# Patient Record
Sex: MALE | Race: WHITE | NOT HISPANIC OR LATINO | Employment: FULL TIME | URBAN - METROPOLITAN AREA
[De-identification: names, ages, dates, MRNs, and addresses within clinical notes are randomized per-mention and may not be internally consistent; named-entity substitution may affect disease eponyms.]

---

## 2023-12-27 ENCOUNTER — HOSPITAL ENCOUNTER (OUTPATIENT)
Facility: HOSPITAL | Age: 23
Setting detail: HOSPITAL OUTPATIENT SURGERY
Discharge: HOME | End: 2023-12-27
Attending: ORTHOPAEDIC SURGERY | Admitting: ORTHOPAEDIC SURGERY
Payer: COMMERCIAL

## 2023-12-27 ENCOUNTER — APPOINTMENT (OUTPATIENT)
Dept: GENERAL RADIOLOGY | Facility: HOSPITAL | Age: 23
End: 2023-12-27
Payer: COMMERCIAL

## 2023-12-27 ENCOUNTER — ANESTHESIA EVENT CONVERTED (OUTPATIENT)
Dept: ANESTHESIOLOGY | Facility: HOSPITAL | Age: 23
End: 2023-12-27
Payer: COMMERCIAL

## 2023-12-27 ENCOUNTER — ANESTHESIA EVENT (OUTPATIENT)
Dept: PERIOP | Facility: HOSPITAL | Age: 23
End: 2023-12-27
Payer: COMMERCIAL

## 2023-12-27 ENCOUNTER — ANESTHESIA (OUTPATIENT)
Dept: PERIOP | Facility: HOSPITAL | Age: 23
End: 2023-12-27
Payer: COMMERCIAL

## 2023-12-27 VITALS
WEIGHT: 248 LBS | OXYGEN SATURATION: 96 % | BODY MASS INDEX: 31.83 KG/M2 | HEART RATE: 105 BPM | TEMPERATURE: 98.9 F | DIASTOLIC BLOOD PRESSURE: 82 MMHG | SYSTOLIC BLOOD PRESSURE: 137 MMHG | RESPIRATION RATE: 18 BRPM | HEIGHT: 74 IN

## 2023-12-27 PROCEDURE — C1713 ANCHOR/SCREW BN/BN,TIS/BN: HCPCS | Performed by: ORTHOPAEDIC SURGERY

## 2023-12-27 PROCEDURE — 25010000002 SUGAMMADEX 200 MG/2ML SOLUTION: Performed by: NURSE ANESTHETIST, CERTIFIED REGISTERED

## 2023-12-27 PROCEDURE — 76000 FLUOROSCOPY <1 HR PHYS/QHP: CPT

## 2023-12-27 PROCEDURE — 25810000003 LACTATED RINGERS PER 1000 ML: Performed by: ANESTHESIOLOGY

## 2023-12-27 PROCEDURE — 25010000002 ONDANSETRON PER 1 MG: Performed by: NURSE ANESTHETIST, CERTIFIED REGISTERED

## 2023-12-27 PROCEDURE — 25010000002 BUPIVACAINE (PF) 0.25 % SOLUTION

## 2023-12-27 PROCEDURE — 25010000002 PROPOFOL 10 MG/ML EMULSION: Performed by: NURSE ANESTHETIST, CERTIFIED REGISTERED

## 2023-12-27 PROCEDURE — 27416 OSTEOCHONDRAL KNEE AUTOGRAFT: CPT

## 2023-12-27 PROCEDURE — C1769 GUIDE WIRE: HCPCS | Performed by: ORTHOPAEDIC SURGERY

## 2023-12-27 PROCEDURE — 25010000002 DEXAMETHASONE SODIUM PHOSPHATE 10 MG/ML SOLUTION

## 2023-12-27 PROCEDURE — 25010000002 FENTANYL CITRATE (PF) 50 MCG/ML SOLUTION

## 2023-12-27 PROCEDURE — 25010000002 VANCOMYCIN 1 G RECONSTITUTED SOLUTION 1 EACH VIAL: Performed by: ORTHOPAEDIC SURGERY

## 2023-12-27 PROCEDURE — 97116 GAIT TRAINING THERAPY: CPT

## 2023-12-27 PROCEDURE — 25010000002 ROPIVACAINE HCL-NACL 0.2-0.9 % SOLUTION

## 2023-12-27 PROCEDURE — 27418 REPAIR DEGENERATED KNEECAP: CPT

## 2023-12-27 PROCEDURE — 27427 RECONSTRUCTION KNEE: CPT

## 2023-12-27 PROCEDURE — 25010000002 ESMOLOL 100 MG/10ML SOLUTION: Performed by: NURSE ANESTHETIST, CERTIFIED REGISTERED

## 2023-12-27 PROCEDURE — 97161 PT EVAL LOW COMPLEX 20 MIN: CPT

## 2023-12-27 PROCEDURE — 25010000002 VANCOMYCIN 1 G RECONSTITUTED SOLUTION: Performed by: ORTHOPAEDIC SURGERY

## 2023-12-27 PROCEDURE — L1833 KO ADJ JNT POS R SUP PRE OTS: HCPCS | Performed by: ORTHOPAEDIC SURGERY

## 2023-12-27 PROCEDURE — 25010000002 DEXAMETHASONE PER 1 MG: Performed by: NURSE ANESTHETIST, CERTIFIED REGISTERED

## 2023-12-27 PROCEDURE — 25010000002 CEFAZOLIN PER 500 MG: Performed by: ORTHOPAEDIC SURGERY

## 2023-12-27 DEVICE — DEV CONTRL TISS STRATAFIX SPIRAL MNCRYL PLS PS 3/0 30CM UD: Type: IMPLANTABLE DEVICE | Site: KNEE | Status: FUNCTIONAL

## 2023-12-27 DEVICE — IMPLANTABLE DEVICE: Type: IMPLANTABLE DEVICE | Site: KNEE | Status: FUNCTIONAL

## 2023-12-27 DEVICE — DEV CONTRL TISS STRATAFIX SPIRAL PDO BIDIR 1 36X36CM: Type: IMPLANTABLE DEVICE | Site: KNEE | Status: FUNCTIONAL

## 2023-12-27 DEVICE — SYS IMP FHL W/BUTN B6.25MM: Type: IMPLANTABLE DEVICE | Site: KNEE | Status: FUNCTIONAL

## 2023-12-27 DEVICE — SUT/ANCH FIBERTAK GEN2 KNOTLSS W/NMBR2/SUT 1.8MM: Type: IMPLANTABLE DEVICE | Site: KNEE | Status: FUNCTIONAL

## 2023-12-27 RX ORDER — SODIUM CHLORIDE 0.9 % (FLUSH) 0.9 %
10 SYRINGE (ML) INJECTION AS NEEDED
Status: DISCONTINUED | OUTPATIENT
Start: 2023-12-27 | End: 2023-12-27 | Stop reason: HOSPADM

## 2023-12-27 RX ORDER — FAMOTIDINE 20 MG/1
20 TABLET, FILM COATED ORAL
Status: COMPLETED | OUTPATIENT
Start: 2023-12-27 | End: 2023-12-27

## 2023-12-27 RX ORDER — BUPIVACAINE HYDROCHLORIDE 2.5 MG/ML
INJECTION, SOLUTION EPIDURAL; INFILTRATION; INTRACAUDAL
Status: COMPLETED | OUTPATIENT
Start: 2023-12-27 | End: 2023-12-27

## 2023-12-27 RX ORDER — ONDANSETRON 2 MG/ML
INJECTION INTRAMUSCULAR; INTRAVENOUS AS NEEDED
Status: DISCONTINUED | OUTPATIENT
Start: 2023-12-27 | End: 2023-12-27 | Stop reason: SURG

## 2023-12-27 RX ORDER — VANCOMYCIN HYDROCHLORIDE 1 G/20ML
INJECTION, POWDER, LYOPHILIZED, FOR SOLUTION INTRAVENOUS AS NEEDED
Status: DISCONTINUED | OUTPATIENT
Start: 2023-12-27 | End: 2023-12-27 | Stop reason: HOSPADM

## 2023-12-27 RX ORDER — SODIUM CHLORIDE 0.9 % (FLUSH) 0.9 %
10 SYRINGE (ML) INJECTION EVERY 12 HOURS SCHEDULED
Status: DISCONTINUED | OUTPATIENT
Start: 2023-12-27 | End: 2023-12-27 | Stop reason: HOSPADM

## 2023-12-27 RX ORDER — MIDAZOLAM HYDROCHLORIDE 1 MG/ML
1 INJECTION INTRAMUSCULAR; INTRAVENOUS
Status: DISCONTINUED | OUTPATIENT
Start: 2023-12-27 | End: 2023-12-27 | Stop reason: HOSPADM

## 2023-12-27 RX ORDER — DEXAMETHASONE SODIUM PHOSPHATE 10 MG/ML
INJECTION, SOLUTION INTRAMUSCULAR; INTRAVENOUS
Status: COMPLETED | OUTPATIENT
Start: 2023-12-27 | End: 2023-12-27

## 2023-12-27 RX ORDER — HYDROMORPHONE HYDROCHLORIDE 1 MG/ML
0.5 INJECTION, SOLUTION INTRAMUSCULAR; INTRAVENOUS; SUBCUTANEOUS
Status: DISCONTINUED | OUTPATIENT
Start: 2023-12-27 | End: 2023-12-27 | Stop reason: HOSPADM

## 2023-12-27 RX ORDER — PROPOFOL 10 MG/ML
VIAL (ML) INTRAVENOUS AS NEEDED
Status: DISCONTINUED | OUTPATIENT
Start: 2023-12-27 | End: 2023-12-27 | Stop reason: SURG

## 2023-12-27 RX ORDER — ESMOLOL HYDROCHLORIDE 10 MG/ML
INJECTION INTRAVENOUS AS NEEDED
Status: DISCONTINUED | OUTPATIENT
Start: 2023-12-27 | End: 2023-12-27 | Stop reason: SURG

## 2023-12-27 RX ORDER — LIDOCAINE HYDROCHLORIDE 10 MG/ML
0.5 INJECTION, SOLUTION EPIDURAL; INFILTRATION; INTRACAUDAL; PERINEURAL ONCE AS NEEDED
Status: COMPLETED | OUTPATIENT
Start: 2023-12-27 | End: 2023-12-27

## 2023-12-27 RX ORDER — IPRATROPIUM BROMIDE AND ALBUTEROL SULFATE 2.5; .5 MG/3ML; MG/3ML
3 SOLUTION RESPIRATORY (INHALATION) ONCE AS NEEDED
Status: DISCONTINUED | OUTPATIENT
Start: 2023-12-27 | End: 2023-12-27 | Stop reason: HOSPADM

## 2023-12-27 RX ORDER — ROCURONIUM BROMIDE 10 MG/ML
INJECTION, SOLUTION INTRAVENOUS AS NEEDED
Status: DISCONTINUED | OUTPATIENT
Start: 2023-12-27 | End: 2023-12-27 | Stop reason: SURG

## 2023-12-27 RX ORDER — FENTANYL CITRATE 50 UG/ML
INJECTION, SOLUTION INTRAMUSCULAR; INTRAVENOUS
Status: COMPLETED | OUTPATIENT
Start: 2023-12-27 | End: 2023-12-27

## 2023-12-27 RX ORDER — DEXAMETHASONE SODIUM PHOSPHATE 4 MG/ML
INJECTION, SOLUTION INTRA-ARTICULAR; INTRALESIONAL; INTRAMUSCULAR; INTRAVENOUS; SOFT TISSUE AS NEEDED
Status: DISCONTINUED | OUTPATIENT
Start: 2023-12-27 | End: 2023-12-27 | Stop reason: SURG

## 2023-12-27 RX ORDER — ROPIVACAINE HYDROCHLORIDE 2 MG/ML
INJECTION, SOLUTION EPIDURAL; INFILTRATION; PERINEURAL CONTINUOUS
Status: DISCONTINUED | OUTPATIENT
Start: 2023-12-27 | End: 2023-12-27 | Stop reason: HOSPADM

## 2023-12-27 RX ORDER — MAGNESIUM HYDROXIDE 1200 MG/15ML
LIQUID ORAL AS NEEDED
Status: DISCONTINUED | OUTPATIENT
Start: 2023-12-27 | End: 2023-12-27 | Stop reason: HOSPADM

## 2023-12-27 RX ORDER — SODIUM CHLORIDE 9 MG/ML
40 INJECTION, SOLUTION INTRAVENOUS AS NEEDED
Status: DISCONTINUED | OUTPATIENT
Start: 2023-12-27 | End: 2023-12-27 | Stop reason: HOSPADM

## 2023-12-27 RX ORDER — SODIUM CHLORIDE, SODIUM LACTATE, POTASSIUM CHLORIDE, CALCIUM CHLORIDE 600; 310; 30; 20 MG/100ML; MG/100ML; MG/100ML; MG/100ML
9 INJECTION, SOLUTION INTRAVENOUS CONTINUOUS PRN
Status: DISCONTINUED | OUTPATIENT
Start: 2023-12-27 | End: 2023-12-27 | Stop reason: HOSPADM

## 2023-12-27 RX ORDER — FENTANYL CITRATE 50 UG/ML
50 INJECTION, SOLUTION INTRAMUSCULAR; INTRAVENOUS
Status: DISCONTINUED | OUTPATIENT
Start: 2023-12-27 | End: 2023-12-27 | Stop reason: HOSPADM

## 2023-12-27 RX ORDER — LIDOCAINE HYDROCHLORIDE 10 MG/ML
INJECTION, SOLUTION EPIDURAL; INFILTRATION; INTRACAUDAL; PERINEURAL AS NEEDED
Status: DISCONTINUED | OUTPATIENT
Start: 2023-12-27 | End: 2023-12-27 | Stop reason: SURG

## 2023-12-27 RX ORDER — FENTANYL CITRATE 50 UG/ML
INJECTION, SOLUTION INTRAMUSCULAR; INTRAVENOUS AS NEEDED
Status: DISCONTINUED | OUTPATIENT
Start: 2023-12-27 | End: 2023-12-27 | Stop reason: SURG

## 2023-12-27 RX ORDER — ONDANSETRON 2 MG/ML
4 INJECTION INTRAMUSCULAR; INTRAVENOUS ONCE AS NEEDED
Status: DISCONTINUED | OUTPATIENT
Start: 2023-12-27 | End: 2023-12-27 | Stop reason: HOSPADM

## 2023-12-27 RX ADMIN — SUGAMMADEX 200 MG: 100 INJECTION, SOLUTION INTRAVENOUS at 15:08

## 2023-12-27 RX ADMIN — PROPOFOL 200 MG: 10 INJECTION, EMULSION INTRAVENOUS at 12:29

## 2023-12-27 RX ADMIN — SODIUM CHLORIDE, POTASSIUM CHLORIDE, SODIUM LACTATE AND CALCIUM CHLORIDE: 600; 310; 30; 20 INJECTION, SOLUTION INTRAVENOUS at 12:54

## 2023-12-27 RX ADMIN — DEXAMETHASONE SODIUM PHOSPHATE 2 MG: 10 INJECTION, SOLUTION INTRAMUSCULAR; INTRAVENOUS at 11:13

## 2023-12-27 RX ADMIN — ROCURONIUM BROMIDE 50 MG: 50 INJECTION INTRAVENOUS at 13:04

## 2023-12-27 RX ADMIN — FENTANYL CITRATE 50 MCG: 50 INJECTION, SOLUTION INTRAMUSCULAR; INTRAVENOUS at 12:44

## 2023-12-27 RX ADMIN — FENTANYL CITRATE 100 MCG: 50 INJECTION, SOLUTION INTRAMUSCULAR; INTRAVENOUS at 11:00

## 2023-12-27 RX ADMIN — SODIUM CHLORIDE, POTASSIUM CHLORIDE, SODIUM LACTATE AND CALCIUM CHLORIDE 9 ML/HR: 600; 310; 30; 20 INJECTION, SOLUTION INTRAVENOUS at 10:47

## 2023-12-27 RX ADMIN — PROPOFOL 200 MG: 10 INJECTION, EMULSION INTRAVENOUS at 12:28

## 2023-12-27 RX ADMIN — BUPIVACAINE HYDROCHLORIDE 20 ML: 2.5 INJECTION, SOLUTION EPIDURAL; INFILTRATION; INTRACAUDAL at 11:13

## 2023-12-27 RX ADMIN — LIDOCAINE HYDROCHLORIDE 0.5 ML: 10 INJECTION, SOLUTION EPIDURAL; INFILTRATION; INTRACAUDAL; PERINEURAL at 10:47

## 2023-12-27 RX ADMIN — FENTANYL CITRATE 50 MCG: 50 INJECTION, SOLUTION INTRAMUSCULAR; INTRAVENOUS at 12:29

## 2023-12-27 RX ADMIN — BUPIVACAINE HYDROCHLORIDE 30 ML: 2.5 INJECTION, SOLUTION EPIDURAL; INFILTRATION; INTRACAUDAL; PERINEURAL at 11:03

## 2023-12-27 RX ADMIN — Medication 1000 MG: at 16:00

## 2023-12-27 RX ADMIN — ONDANSETRON 4 MG: 2 INJECTION INTRAMUSCULAR; INTRAVENOUS at 15:06

## 2023-12-27 RX ADMIN — PROPOFOL 200 MG: 10 INJECTION, EMULSION INTRAVENOUS at 12:44

## 2023-12-27 RX ADMIN — ESMOLOL HYDROCHLORIDE 50 MG: 10 INJECTION, SOLUTION INTRAVENOUS at 15:50

## 2023-12-27 RX ADMIN — FAMOTIDINE 20 MG: 20 TABLET, FILM COATED ORAL at 10:47

## 2023-12-27 RX ADMIN — DEXAMETHASONE SODIUM PHOSPHATE 8 MG: 4 INJECTION, SOLUTION INTRAMUSCULAR; INTRAVENOUS at 12:36

## 2023-12-27 RX ADMIN — LIDOCAINE HYDROCHLORIDE 50 MG: 10 INJECTION, SOLUTION EPIDURAL; INFILTRATION; INTRACAUDAL; PERINEURAL at 12:29

## 2023-12-27 RX ADMIN — SODIUM CHLORIDE 2000 MG: 900 INJECTION INTRAVENOUS at 12:33

## 2023-12-27 NOTE — PLAN OF CARE
Goal Outcome Evaluation:  Plan of Care Reviewed With: patient, sibling        Progress: no change  Outcome Evaluation: Pt amb 40' with FWW and CGA. Pt navigated a step with CGA and FWW. VC for sequencing. Good ability to maintain PWB throughout mobility. Pt encouraged to utilize FWW for stairs for safest option when returning home today. Pt and brother verbalized understanding and stated they had a FWW at home. Recommend d/c home with 24/7 assist and OPPT when medically appropriate.      Anticipated Discharge Disposition (PT): home with outpatient therapy services, home with assist

## 2023-12-27 NOTE — ANESTHESIA POSTPROCEDURE EVALUATION
Patient: Nhan Merritt    Procedure Summary       Date: 12/27/23 Room / Location:  [facility] /  [facility]    Anesthesia Start: 1225 Anesthesia Stop: 1600    Procedures:       KNEE ARTHROSCOPY AUTOLOGOUS CHONDROCYTE IMPLANTATION, TIBIAL TURBERICAL OSTEOTOMY, MEDIAL PATELLOFEMORAL RECONSTRUCTION (Left: Knee)      KNEE ARTHROSCOPY WITH PATELLA FEMORAL LIGAMENT RECONSTRUCTION (Left: Knee) Diagnosis:     Surgeons: Delmer Maria MD Provider: Lb Neumann MD    Anesthesia Type: general ASA Status: 2            Anesthesia Type: general    Vitals  Vitals Value Taken Time   BP     Temp     Pulse 96 12/27/23 1559   Resp     SpO2 95 % 12/27/23 1559   Vitals shown include unfiled device data.        Post Anesthesia Care and Evaluation    Patient location during evaluation: PACU  Patient participation: complete - patient participated  Level of consciousness: awake and alert  Pain management: adequate    Airway patency: patent  Anesthetic complications: No anesthetic complications  PONV Status: none  Cardiovascular status: hemodynamically stable and acceptable  Respiratory status: nonlabored ventilation, acceptable and nasal cannula  Hydration status: acceptable

## 2023-12-27 NOTE — H&P
"  Pre-Op H&P  Nhan Merritt  [id number]  2000      Chief complaint: Left knee pain      Subjective:  Patient is a 23 y.o.male presents for scheduled surgery by Dr. Maria. He anticipates a KNEE ARTHROSCOPY OSTEOCHONDRAL ALLOGRAFT, TIBIAL TURBERICAL OSTEOTOMY, MEDIAL PATELLOFEMORAL RECONSTRUCTION - Left today.  The patient endorses dislocating his left knee during a basketball game in October of 2023.  He endorses pain since that time.  He denies doing anything to help alleviate his pain.  He denies the use of a carter or assistive device to ambulate.    Review of Systems:  Constitutional-- No fever, chills or sweats. No fatigue.  CV-- No chest pain, palpitation or syncope  Resp-- No SOB, cough, hemoptysis  Skin--No rashes or lesions      Allergies: No Known Allergies      Home Meds:  Medications Prior to Admission   Medication Sig Dispense Refill Last Dose    aspirin (Adult Aspirin Regimen) 81 MG EC tablet Take 1 tablet by mouth Daily. 28 tablet 0     ondansetron (ZOFRAN) 4 MG tablet Take 1 tablet by mouth Every 6 (Six) Hours As Needed for Nausea. 30 tablet 0     oxyCODONE (ROXICODONE) 5 MG immediate release tablet Take 1-2 tablet(s) by mouth Every 4-6 Hours As Needed for Post-Op Pain. 36 tablet 0          PMH: History reviewed. No pertinent past medical history.  PSH:    Past Surgical History:   Procedure Laterality Date    FINGER SURGERY Right     cyst removal       Immunization History:  Influenza: No  Pneumococcal: No  Tetanus: Yes  Covid : x2    Social History:   Tobacco:   Social History     Tobacco Use   Smoking Status Never    Passive exposure: Current   Smokeless Tobacco Never      Alcohol:     Social History     Substance and Sexual Activity   Alcohol Use Not Currently         Physical Exam:Ht 188 cm (74\")   Wt 112 kg (248 lb)   BMI 31.84 kg/m²   BP: 151/98  HR: 99  RR: 17  SPO2: 100% on RA  Temp: 98.6    General Appearance:    Alert, cooperative, no distress, appears stated age   Head:    " "Normocephalic, without obvious abnormality, atraumatic   Lungs:     Clear to auscultation bilaterally, respirations unlabored    Heart:   Regular rate and rhythm, S1 and S2 normal    Abdomen:    Soft without tenderness   Extremities:   Extremities normal, atraumatic, no cyanosis or edema   Skin:   Skin color, texture, turgor normal, no rashes or lesions   Neurologic:   Grossly intact     Results Review:     LABS:  No results found for: \"WBC\", \"HGB\", \"HCT\", \"MCV\", \"PLT\", \"NEUTROABS\", \"GLUCOSE\", \"BUN\", \"CREATININE\", \"EGFRIFNONA\", \"EGFRIFAFRI\", \"NA\", \"K\", \"CL\", \"CO2\", \"MG\", \"PHOS\", \"CALCIUM\", \"ALBUMIN\", \"AST\", \"ALT\", \"BILITOT\"    RADIOLOGY:  Imaging Results (Last 72 Hours)       ** No results found for the last 72 hours. **            I reviewed the patient's new clinical results.    Impression: Left knee chondromalacia      Plan: KNEE ARTHROSCOPY OSTEOCHONDRAL ALLOGRAFT, TIBIAL TURBERICAL OSTEOTOMY, MEDIAL PATELLOFEMORAL RECONSTRUCTION - Left       Sohan Dominguez, APRN   12/27/2023   10:27 EST  "

## 2023-12-27 NOTE — DISCHARGE INSTRUCTIONS
InfuBLOCK - Patient Information    What is a pain pump?  The InfuBLOCK pump delivers post-operative, non-narcotic, numbing medication to the nerve near the surgical site for pain relief.     Where can I find information about my pain pump?           For more information about your pain pump, scan the QR code.  For additional patient resources, visit [email or web address].                                                                                               While your physician is your primary source for information about your treatment there may be times during your treatment that you need assistance with your infusion pump.     If you need assistance take the following steps:    The Patients Know Best Nursing Hotline is Here for You 24/7.  Please call [phone] for the following concerns or complications:    Answers to questions about your infusion pump                 Tubing disconnect  Assistance with pump alarms                                                      Dislodged catheter  Excessive leakage noted from pump                                         Inadequate pain control    2.   [facility] Anesthesia Acute Pain Service: [phone] is available 24/7 for any further needs or concerns about medication or pain control.     -------------------------------------------------------------------------    Nerve Catheter Removal Instructions  When your device is empty:    Remove your catheter by pulling the dressing off slowly (like you would remove a regular bandage). The catheter should pull right out of the skin.  Check that the BLUE tip is intact.                                                                                     If the catheter is stuck, reposition your   extremity and pull slowly until removed.  *If catheter is HURTING and WON'T come out, stop and call [phone] for further assistance.    Remove medication bag from the black carrying case.  Cut the  tubing on right and left side of pump, and discard the medication bag and tubing into garbage.  Place the pump and black carrying case into the plastic bag and then place this into the return box.  Seal box with blue stickers and return to [place] postal service. THIS IS PRE-PAID POSTAGE.        -------------------------------------------------------------------------    [facility] COLD THERAPY - PATIENT INSTRUCTION SHEET    Cold Compression Therapy for your comfort and rehabilitation  Your caregivers want you to be productive in your rehab and comfortable during your stay. In keeping with those goals, you will be receiving an SMI Cold Therapy Wrap to help ease post-operative pain and swelling that might keep you from getting back on track! Your SMI Cold Therapy Wrap is effective and simple-to-use, and you will be encouraged to apply it throughout your hospital stay and at home through the duration of your recovery.    When you are ready to go home  Be sure to take your SMI Cold Therapy Wrap and both sets of Gel Bags with you for continued comfort and use throughout your rehabilitation. If you don't already have them, ask your nurse or aide to retrieve your SMI Gel Bags from the patient freezer.    Home use precautions  Always follow your medical professional's application instructions upon discharge. Your SMI Cold Therapy Wrap and Gel Bags are designed to last for months following your surgery. Never heat the Gel Bags unless specified by your healthcare provider. Supervision is advised when using this product on children or geriatric patients. To avoid danger of suffocation, please keep the outer plastic packaging away from children & pets.    Cold Therapy Instructions  Place Gel Bags in a freezer set ¾ of the way to max temperature for at least (4) hours. For best results, lay the Gel Bags flat and [id number] in the freezer. Once frozen, slide Gel Bags into the gel pouch and secure your wrap to the affected area with the  straps.  Gel wraps that have been stored in a freezer for an extended period of time may require a (10) minute period of softening up in a room temperature environment before application.  The gel pouch acts as a protective barrier. NEVER place frozen bags directly onto skin, as this may cause frostbite injury.  The [facility] Cold Therapy Wrap is designed to be able to be worm while ambulating. The compression straps can be secured well enough so that the Wrap won't fall off while moving.  Wrap Application Videos can be viewed at [email or web address].  An additional protective barrier such as clothing, a washcloth, hand-towel or pillowcase may be used during prolonged treatment applications.  The Gel-Pouch and Wrap are both Latex-Free and the Gel Bag ingredients are non toxic.    [facility] Wrap care instructions  The [facility] Cold Therapy Wrap may be hand washed and hung to dry when needed.    [facility] re-order information  Additional [facility] body specific wraps and/or Gel Bags can be re-ordered from [employer] or call [employer] ([phone])

## 2023-12-27 NOTE — ANESTHESIA PROCEDURE NOTES
Left Femoral Nerve Cath      Patient reassessed immediately prior to procedure    Patient location during procedure: pre-op  Reason for block: at surgeon's request and post-op pain management  Performed by  CRNA/CAA: Gianni Rendon CRNA  Assisted by: Layla Hernandez RN  Preanesthetic Checklist  Completed: patient identified, IV checked, site marked, risks and benefits discussed, surgical consent, monitors and equipment checked, pre-op evaluation and timeout performed  Prep:  Pt Position: supine  Sterile barriers:gloves, cap, sterile barriers, mask and washed/disinfected hands  Prep: ChloraPrep  Patient monitoring: blood pressure monitoring, continuous pulse oximetry and EKG  Procedure    Sedation: yes  Performed under: local infiltration  Guidance:ultrasound guided    ULTRASOUND INTERPRETATION.  Using ultrasound guidance a 20 G gauge needle was placed in close proximity to the femoral nerve, at which point, under ultrasound guidance anesthetic was injected in the area of the nerve and spread of the anesthesia was seen on ultrasound in close proximity thereto.  There were no abnormalities seen on ultrasound; a digital image was taken; and the patient tolerated the procedure with no complications. Images:still images obtained, printed/placed on chart    Laterality:left  Block Type:femoral  Injection Technique:catheter  Needle Type:short-bevel  Needle Gauge:20 G  Resistance on Injection: none  Cath Depth at skin: 10 cm    Medications Used: fentaNYL citrate (PF) (SUBLIMAZE) injection - Intravenous   100 mcg - 12/27/2023 11:00:00 AM  bupivacaine PF (MARCAINE) 0.25 % injection - Injection   30 mL - 12/27/2023 11:03:00 AM      Medications  Preservative Free Saline:5ml    Post Assessment  Injection Assessment: negative aspiration for heme, no paresthesia on injection and incremental injection  Patient Tolerance:comfortable throughout block  Complications:no  Additional Notes  CATHETER  A high-frequency linear  "transducer, with sterile cover, was placed in the inguinal crease to visualize the Femoral Vein, Artery, and Nerve (medial to lateral). The insertion site was prepped in sterile fashion. Skin and cutaneous tissue was infiltrated with 2-5 ml of 1% Lidocaine. Using ultrasound-guidance, an 18-gauge Contiplex Ultra 360 Touhy Needle was then inserted and advanced in-plane from lateral to medial with ultrasound guidance. The Touhy needle was directed below Fascia Iliacus towards the Femoral nerve. Preservative-free normal saline was utilized for hydro-dissection of tissue. Local anesthetic injection spread, in incremental 3-5 ml injections, was visualized lateral to the artery to surround the femoral nerve. Aspiration every 5 ml to prevent intravascular injection. Injection was completed with negative aspiration of blood and negative intravascular injection. Injection pressures were normal with minimal resistance. A 20-gauge Snapfishiplex Echo catheter was placed through the needle and advanced out the tip of the Touhy 1-3 cm. The Touhy needle was then removed, and final catheter position verified lateral to the femoral artery. The catheter was secured in the usual fashion with skin glue, benzoin, steri-strips, CHG tegaderm and Label noting \"Nerve Block Catheter\". Jerk tape applied at yellow connector and catheter connection.           "

## 2023-12-27 NOTE — OP NOTE
TIBIAL OSTEOTOMY, KNEE ARTHROSCOPY WITH PATELLA FEMORAL LIGAMENT RECONSTRUCTION  Procedure Report    Patient Name:  Nhan Merritt  YOB: 2000    Date of Surgery:  12/27/2023     Indications: 23-year-old male with history of patella instability and cartilage injury of his patella.  Treatment options were discussed the patient including operative versus nonoperative treatment.  Risks and benefits of surgery were discussed including but not limited to bleeding, infection, injury to surrounding structures such as blood vessels tendons and nerves. We discussed the possibility of surgical procedure failure, recurrent instability, malunion, nonunion, painful hardware, persistent pain, loss of motion, persistent stiffness, persistent weakness, and the need for a revision surgery. In addition, we discussed the risk of heart attack, stroke, or death.  Patient understands these as well as alternative forms of treatment and does elect to proceed.    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    Post-op Diagnosis:       Left knee patellar instability with patella cartilage injury    Procedure/CPT® Codes:      Procedure(s):  KNEE DIAGNOSTIC ARTHROSCOPY, open AUTOLOGOUS CHONDROCYTE IMPLANTATION, TIBIAL TURBERICAL OSTEOTOMY, MEDIAL PATELLOFEMORAL RECONSTRUCTION    Staff:  Surgeon(s):  Delmer Maria MD    Circulator: Doc Oliver RN; Airam Koenig RN  Radiology Technologist: Layla Ball R.T.(R)  Scrub Person: Alejandra Dobbs; Judson Henley  Assistant: Pasha Brothers PA-C     Assistant: Pasha Brothers PA-C  Indication for surgical assistant:  Surgical assistant was indicated for assistance with patient positioning as well as critical portions of the procedure including soft tissue retraction,  placement of implants/hardware, preparation of the graft, and closure of wounds.    Anesthesia: General    Estimated Blood Loss:  50cc    Implants:    Implant Name  Type Inv. Item Serial No.  Lot No. LRB No. Used Action   SHEKHAR SINGLE STRAND NON-BONE TDN FROZEN   [id number] [employer]OS[employer] [id number] Left 1 Implanted   SCRW CALDERON L/P FUL/THRD TI 4.5X55MM - [id number] Implant SCRW CALDERON L/P FUL/THRD TI 4.5X55MM  [employer] N/A Left 1 Implanted   SCRW CALDERON L/P FUL/THRD TI 4.5X60MM - [id number] Implant SCRW CALDERON L/P FUL/THRD TI 4.5X60MM  [employer] N/A Left 1 Implanted   SCRW CALDERON L/P FUL/THRD TI 4.5X50MM - [id number] Implant SCRW CALDERON L/P FUL/THRD TI 4.5X50MM  [employer] N/A Left 1 Implanted   SUT/ANCH FIBERTAK GEN2 KNOTLSS W/NMBR2/SUT 1.8MM - [id number] Implant SUT/ANCH FIBERTAK GEN2 KNOTLSS W/NMBR2/SUT 1.8MM  [employer] [id number] Left 2 Implanted   SYS IMP FHL W/BUTN B6.25MM - [id number] Implant SYS IMP FHL W/BUTN B6.25MM  [employer] [id number] Left 1 Implanted   DEV CONTRL TISS STRATAFIX SPIRAL PDO BIDIR 1 32E45PB - [id number] Implant DEV CONTRL TISS STRATAFIX SPIRAL PDO BIDIR 1 [id number]  [employer]  DIV OF J AND J [id number] Left 1 Implanted   DEV CONTRL TISS STRATAFIX SPIRAL MNCRYL PLS PS 3/0 30CM UD - [id number] Implant DEV CONTRL TISS STRATAFIX SPIRAL MNCRYL PLS PS 3/0 30CM UD  [employer]  DIV OF J AND J [id number] Left 1 Implanted       Specimen:                None      Complications: None apparent    Description of Procedure:     Patient was identified in the preoperative holding area and the left knee was marked.  Patient was transported to the OR and place supine on the operative table.  General anesthesia was induced.  Examination of the knee showed 3-4+ lateral patellar translation with stability to anterior and posterior drawer varus and valgus stress.  The left knee was prepped and draped in the usual sterile fashion.  Preoperative time out was performed indicating correct patient, correct side, correct procedure, and that preoperative antibiotic had been given.  Next an Esmarch was used to exsanguinate the extremity and the tourniquet was inflated to 300mmHg.    I began  with a midline incision over the central aspect of the knee.  Dissection was carried into the subcutaneous tissues to the patella patella tendon and tibial tubercle were identified.  At this point I proceeded with a diagnostic arthroscopy portion of the procedure.  Standard anterior lateral portal was created lateral to the patella tendon.  The scope was placed in the anterolateral portal.  The anterior medial portal was created.  Diagnostic exam of the medial compartment showed no articular cartilage changes over the medial femoral condyle or medial tibial plateau.  The medial meniscus showed no evidence of tearing.   Within the notch the ACL and PCL were noted to be intact.  In the lateral compartment there was noted to be no cartilage changes.  The lateral meniscus was noted to have no tearing.  The scope was then driven into the patellofemoral joint where there was noted to be grade 3-4 changes of the undersurface of the patella articular cartilage centrally and slightly inferiorly.  No trochlear groove cartilage injury.  Shaver with an attached graft that was utilized to debride the cartilage injury to a stable rim of tissue which was essentially down to bone at this point.  The captured cartilage was then taken to the back table for later reimplantation.  There were a couple of loose bodies noted in the lateral gutter which were removed as well.  No loose bodies in the suprapatellar pouch or medial gutter..  Once this was complete the knee was drained of excess fluid and the scope was removed from the knee.    I next turned my attention to the the TTO portion.  The patella tendon was freed of soft tissue and a medial parapatellar arthrotomy was performed to the level of the superior pole of the patella.  Soft tissue was debrided off the lateral compartment to expose the lateral aspect of the tibia.  A guidewire was placed in the tibial tubercle which was utilized to place the 45 degree jig onto the proximal  anterior tibia.  Retractors were placed around the proximal tibia.  The 45 degree jig was then secured using 3 wires.  The position of the jig was checked using the outrigger arm to ensure adequate position of the cut.  Next a combination of saws was used to complete the tibial tubercle osteotomy leaving a small hinge of tissue distally.    With this I was able to daniela the patella and visualized the cartilage defect.  I completed the preparation of the defect at this point utilizing a curette to create vertical walls as well as to remove the calcified cartilage layer.  Once this was complete the whole blood was obtained for the patient was mixed with the previously obtained autologous cartilage.  This was then inserted into the defect to fill just below the level of the surrounding cartilage.  Fibrin glue was then placed over the top of this graft and allowed to dry for 10 minutes.  Once complete I next continued with the TTO portion.  The tubercle piece was shifted 10 mm medially and anteriorly.  K wires were placed across the osteotomy site to hold this reduction.  X-rays were obtained to show appropriate position of the wires and then propria length screws were measured.  I sequentially overreamed and inserted 4.5 mm cannulated screws securing the fixation.  In total 3 were utilized.  Once this was completed is able to flex knee 0 to greater than 90 degrees with good reduction maintained through the osteotomy site.  The overall pole of the patella tendon was noted to be more appropriate at this point as well.  An extra my attention to the MPF L reconstruction.    The upper half of the patella was then exposed on the medial border.  2 knotless fiber tack anchors were placed along the upper border of the patella on the medial side spaced approximately 15 mm apart.  The gracilis allograft which had been previously prepared on the back table was then secured to the anchors using the knotless locking mechanism to  create an onlay compression technique.   The graft was then wrapped in the vancomycin soaked gauze and placed aside.   Next the space for the second and third layer of the medial side of the knee was opened.  Next C-arm was used to find Schottle's point.  A guidewire was driven across from the medial to the lateral side of the knee and out the lateral skin.  This was then overreamed using a reamer.  A loop suture was then passed between the incision over the medial side of the knee and the incision over the medial side of the patella and used to shuttle the graft.  The graft was then pulled through into the tunnel.  The knee was placed in 30° of flexion and tension was placed on the graft so that the lateral border of the patella lined up the lateral femoral condyle.  Gentle tension was held on the graft in this position and the knee was cycled through full range of motion to ensure no tightening of the graft particular with flexion.  Next,a 6.25 mm tenodesis screw was placed securing the graft into the femoral tunnel.  The knee was brought into extension the patella was noted to track nicely without excess tension with a good firm endpoint 1+ lateral shift.     Next the wound was copiously irrigated.  1 g of mitomycin was placed within the wound.  A 0 Vicryl and running #2 strata fix was used to close the deep layer over the medial side of the patella.  The tourniquet was dropped to ensure there was no excessive bleeding which there was none identified. 0 Vicryl suture was used to loosely approximate the lateral soft tissues of the anterior compartment as well.   The wounds were then irrigated and closed using 2-0 strata fix followed by a 3-0 Monocryl.  The leg was then washed and dressed.  The medial incision was closed with 2-0 Vicryl and 3-0 Monocryl.  Patient was placed in a postoperative knee hinged brace locked in extension to protect and stabilize the repair.  he was then awoken and taken to the recovery  room in stable condition.    Disposition:  he  will be partial weightbearing to the left lower extremity with the Brace on at all times locked in extension.  he will start physical therapy per the proximal and distal alignment protocol.  He can progress his weightbearing per this protocol with the assistance of physical therapy.  I will see him back in 10-14 days for first postoperative check.  He should have postoperative x-rays at this point.    Delmer Maria MD     Date: 12/27/2023  Time: 16:08 EST

## 2023-12-27 NOTE — ANESTHESIA PREPROCEDURE EVALUATION
Anesthesia Evaluation     Patient summary reviewed and Nursing notes reviewed   no history of anesthetic complications:   NPO Solid Status: > 8 hours  NPO Liquid Status: > 2 hours           Airway   Mallampati: I  TM distance: >3 FB  Neck ROM: full  No difficulty expected  Dental - normal exam     Pulmonary - negative pulmonary ROS    breath sounds clear to auscultation  Cardiovascular - negative cardio ROS  Exercise tolerance: excellent (>7 METS)    Rhythm: regular  Rate: normal        Neuro/Psych- negative ROS  GI/Hepatic/Renal/Endo    (+) obesity    Musculoskeletal (-) negative ROS    Abdominal   (+) obese    Abdomen: soft.   Substance History - negative use     OB/GYN          Other - negative ROS                   Anesthesia Plan    ASA 2     general     intravenous induction     Anesthetic plan, risks, benefits, and alternatives have been provided, discussed and informed consent has been obtained with: patient.    Plan discussed with CRNA.    CODE STATUS:

## 2023-12-27 NOTE — ANESTHESIA PROCEDURE NOTES
Left Politeal Plexus      Patient location during procedure: pre-op  Reason for block: at surgeon's request and post-op pain management  Performed by  CRNA/CAA: Gianni Rendon CRNA  Assisted by: Layla Hernandez RN  Preanesthetic Checklist  Completed: patient identified, IV checked, site marked, risks and benefits discussed, surgical consent, monitors and equipment checked, pre-op evaluation and timeout performed  Prep:  Pt Position: supine  Sterile barriers:cap, gloves and sterile barriers  Prep: ChloraPrep  Patient monitoring: blood pressure monitoring, continuous pulse oximetry and EKG  Procedure    Sedation: yes  Performed under: local infiltration  Guidance:ultrasound guided  Images:still images obtained, printed/placed on chart    Laterality:left  Anesthesia block type: Popliteal Plexus.  Injection Technique:single-shot  Needle Type:echogenic  Needle Gauge:18 G  Resistance on Injection: none    Medications Used: dexamethasone sodium phosphate injection - Injection   2 mg - 12/27/2023 11:13:00 AM  bupivacaine PF (MARCAINE) 0.25 % injection - Injection   20 mL - 12/27/2023 11:13:00 AM      Medications  Preservative Free Saline:5ml    Post Assessment  Patient Tolerance:comfortable throughout block  Complications:no  Additional Notes  Pop plexus

## 2023-12-27 NOTE — THERAPY EVALUATION
Patient Name: Nhan Merritt  : 2000    MRN: [id number]                              Today's Date: 2023       Admit Date: 2023    Visit Dx: No diagnosis found.  There is no problem list on file for this patient.    History reviewed. No pertinent past medical history.  Past Surgical History:   Procedure Laterality Date    FINGER SURGERY Right     cyst removal      General Information       Row Name 23 171          Physical Therapy Time and Intention    Document Type evaluation  -     Mode of Treatment physical therapy  -       Row Name 23          General Information    Patient Profile Reviewed yes  -     Prior Level of Function independent:;all household mobility;community mobility;gait;transfer;bed mobility;ADL's  -     Existing Precautions/Restrictions brace on at all times;fall;left;partial weight bearing;other (see comments)  L femoral nerve cath; KI on at all times. 25% PWB  -     Barriers to Rehab none identified  -       Row Name 23          Living Environment    People in Home sibling(s);grandparent(s)  -       Row Name 23          Home Main Entrance    Number of Stairs, Main Entrance two;other (see comments)  1+1 at front door; 2 through garage  -       Row Name 23          Stairs Within Home, Primary    Stairs, Within Home, Primary basement steps; pt plans to stay on main level at d/c  -     Number of Stairs, Within Home, Primary twelve  -       Row Name 23          Cognition    Orientation Status (Cognition) oriented x 3  -       Row Name 23          Safety Issues, Functional Mobility    Safety Issues Affecting Function (Mobility) insight into deficits/self-awareness;awareness of need for assistance;safety precaution awareness;steps too close to assistive device  -     Impairments Affecting Function (Mobility) balance;endurance/activity tolerance;pain;strength;range of motion (ROM)   -HW               User Key  (r) = Recorded By, (t) = Taken By, (c) = Cosigned By      Initials Name Provider Type    HW Melanie Friedman, PT Physical Therapist                   Mobility       Row Name 12/27/23 1721          Bed Mobility    Bed Mobility supine-sit;sit-supine  -HW     Supine-Sit [place] (Bed Mobility) minimum assist (75% patient effort);nonverbal cues (demo/gesture);verbal cues  -HW     Sit-Supine [place] (Bed Mobility) minimum assist (75% patient effort);verbal cues;nonverbal cues (demo/gesture)  -HW     Comment, (Bed Mobility) assist with LLE management secondary to elevated pain and quad weakness  -       Row Name 12/27/23 1721          Transfers    Comment, (Transfers) Pt performed STS with FWW and CGA. VC to maintain PWB with good compliance. VC for hand placement  -       Row Name 12/27/23 1721          Sit-Stand Transfer    Sit-Stand [place] (Transfers) contact guard;nonverbal cues (demo/gesture);verbal cues  -HW     Assistive Device (Sit-Stand Transfers) walker, front-wheeled  -       Row Name 12/27/23 1721          Gait/Stairs (Locomotion)    [place] Level (Gait) contact guard;verbal cues;nonverbal cues (demo/gesture)  -HW     Assistive Device (Gait) walker, front-wheeled  -HW     Patient was able to Ambulate yes  -HW     Distance in Feet (Gait) 40  -HW     Deviations/Abnormal Patterns (Gait) bilateral deviations;stride length decreased;weight shifting decreased;gait speed decreased  -HW     Bilateral Gait Deviations forward flexed posture;heel strike decreased  -HW     [place] Level (Stairs) contact guard;nonverbal cues (demo/gesture);verbal cues  -     Assistive Device (Stairs) walker, front-wheeled  -HW     Number of Steps (Stairs) 1  -HW     Ascending Technique (Stairs) step-to-step  -HW     Descending Technique (Stairs) step-to-step  -HW     Comment, (Gait/Stairs) Pt amb with FWW and step-to gait pattern. Good ability to maintain PWB throughout session. VC  for pushing through BUE for support, upright posture, and increased safety awareness around turns. Pt navigated a step with FWW and backwards technique. VC for sequencing and demonstration prior to attempt. No LOB noted and good ability to maintain PWB throughout. Pt educated on sequencing with crutches though encouraged pt to utilize FWW for safest option. Pt and brother verbalized understanding.  -       Row Name 12/27/23 1724          Mobility    Extremity Weight-bearing Status left lower extremity  -     Left Lower Extremity (Weight-bearing Status) partial weight-bearing (PWB)   25% PWB  -               User Key  (r) = Recorded By, (t) = Taken By, (c) = Cosigned By      Initials Name Provider Type     Melanie Friedman PT Physical Therapist                   Obj/Interventions       Row Name 12/27/23 1729          Range of Motion Comprehensive    General Range of Motion lower extremity range of motion deficits identified  -     Comment, General Range of Motion L knee ROM locked in neutral  -[facility] Name 12/27/23 1723          Strength Comprehensive (MMT)    General Manual Muscle Testing (MMT) Assessment lower extremity strength deficits identified  -     Comment, General Manual Muscle Testing (MMT) Assessment Pt able to perform B active ankle DF; pt unable to perform L SLR  -       Row Name 12/27/23 7376          Balance    Balance Assessment sitting static balance;sitting dynamic balance;sit to stand dynamic balance;standing static balance;standing dynamic balance  -     Static Sitting Balance standby assist  -     Dynamic Sitting Balance standby assist  -     Position, Sitting Balance sitting edge of bed  -     Static Standing Balance contact guard  -     Dynamic Standing Balance contact guard  -     Position/Device Used, Standing Balance supported;walker, rolling  -     Balance Interventions sitting;standing;sit to stand;occupation based/functional task  -       Row Name 12/27/23  1725          Sensory Assessment (Somatosensory)    Sensory Assessment (Somatosensory) LE sensation intact  -               User Key  (r) = Recorded By, (t) = Taken By, (c) = Cosigned By      Initials Name Provider Type    HW Melanie Friedman, PT Physical Therapist                   Goals/Plan       Row Name 12/27/23 1729          Bed Mobility Goal 1 (PT)    Activity/Assistive Device (Bed Mobility Goal 1, PT) sit to supine/supine to sit  -HW     [place] Level/Cues Needed (Bed Mobility Goal 1, PT) modified independence  -HW     Time Frame (Bed Mobility Goal 1, PT) long term goal (LTG);3 days  -       Row Name 12/27/23 1729          Transfer Goal 1 (PT)    Activity/Assistive Device (Transfer Goal 1, PT) sit-to-stand/stand-to-sit  -HW     [place] Level/Cues Needed (Transfer Goal 1, PT) modified independence  -HW     Time Frame (Transfer Goal 1, PT) long term goal (LTG);3 days  -       Row Name 12/27/23 1729          Gait Training Goal 1 (PT)    Activity/Assistive Device (Gait Training Goal 1, PT) gait (walking locomotion)  -     [place] Level (Gait Training Goal 1, PT) modified independence  -HW     Distance (Gait Training Goal 1, PT) 150  -HW     Time Frame (Gait Training Goal 1, PT) long term goal (LTG);3 days  -       Row Name 12/27/23 1729          Stairs Goal 1 (PT)    Activity/Assistive Device (Stairs Goal 1, PT) ascending stairs;descending stairs  -     [place] Level/Cues Needed (Stairs Goal 1, PT) modified independence  -HW     Number of Stairs (Stairs Goal 1, PT) 2  -HW     Time Frame (Stairs Goal 1, PT) long term goal (LTG);3 days  -     Progress/Outcome (Stairs Goal 1, PT) goal ongoing  -       Row Name 12/27/23 1729          Therapy Assessment/Plan (PT)    Planned Therapy Interventions (PT) balance training;bed mobility training;gait training;home exercise program;ROM (range of motion);patient/family education;stair training;strengthening;stretching;transfer training  -                User Key  (r) = Recorded By, (t) = Taken By, (c) = Cosigned By      Initials Name Provider Type    HW Melanie Friedman, PT Physical Therapist                   Clinical Impression       Row Name 12/27/23 1726          Pain    Pretreatment Pain Rating 3/10  -     Posttreatment Pain Rating 7/10  -     Pain Location - Side/Orientation Left  -     Pain Location generalized  -     Pain Location - knee  -     Pain Intervention(s) Repositioned;Ambulation/increased activity;Elevated  -       Row Name 12/27/23 1726          Plan of Care Review    Plan of Care Reviewed With patient;sibling  -     Progress no change  -     Outcome Evaluation Pt amb 40' with FWW and CGA. Pt navigated a step with CGA and FWW. VC for sequencing. Good ability to maintain PWB throughout mobility. Pt encouraged to utilize FWW for stairs for safest option when returning home today. Pt and brother verbalized understanding and stated they had a FWW at home. Recommend d/c home with 24/7 assist and OPPT when medically appropriate.  -       Row Name 12/27/23 1726          Therapy Assessment/Plan (PT)    Rehab Potential (PT) good, to achieve stated therapy goals  -     Criteria for Skilled Interventions Met (PT) yes;meets criteria;skilled treatment is necessary  -     Therapy Frequency (PT) daily  -       Row Name 12/27/23 1726          Vital Signs    Pre Patient Position Supine  -HW     Intra Patient Position Standing  -HW     Post Patient Position Supine  -       Row Name 12/27/23 1726          Positioning and Restraints    Pre-Treatment Position in bed  -HW     Post Treatment Position bed  -HW     In Bed notified nsg;supine;fowlers;encouraged to call for assist;patient within staff view;with family/caregiver;side rails up x3  in PACU with nsg  -               User Key  (r) = Recorded By, (t) = Taken By, (c) = Cosigned By      Initials Name Provider Type    HW Melanie Friedman, PT Physical Therapist                    Outcome Measures       Row Name 12/27/23 1729          How much help from another person do you currently need...    Turning from your back to your side while in flat bed without using bedrails? 3  -HW     Moving from lying on back to sitting on the side of a flat bed without bedrails? 3  -HW     Moving to and from a bed to a chair (including a wheelchair)? 4  -HW     Standing up from a chair using your arms (e.g., wheelchair, bedside chair)? 4  -HW     Climbing 3-5 steps with a railing? 3  -HW     To walk in hospital room? 3  -HW     AM-PAC 6 Clicks Score (PT) 20  -HW     Highest Level of Mobility Goal 6 --> Walk 10 steps or more  -       Row Name 12/27/23 1729          Functional Assessment    Outcome Measure Options AM-PAC 6 Clicks Basic Mobility (PT)  -               User Key  (r) = Recorded By, (t) = Taken By, (c) = Cosigned By      Initials Name Provider Type     Melanie Friedman PT Physical Therapist                                 Physical Therapy Education       Title: PT OT SLP Therapies (Done)       Topic: Physical Therapy (Done)       Point: Mobility training (Done)       Learning Progress Summary             Patient Acceptance, E,D, VU,DU by  at 12/27/2023 1731                         Point: Home exercise program (Done)       Learning Progress Summary             Patient Acceptance, E,D, VU,DU by  at 12/27/2023 1731                         Point: Body mechanics (Done)       Learning Progress Summary             Patient Acceptance, E,D, VU,DU by  at 12/27/2023 1731                         Point: Precautions (Done)       Learning Progress Summary             Patient Acceptance, E,D, VU,DU by  at 12/27/2023 1731                                         User Key       Initials Effective Dates Name Provider Type Discipline     12/15/23 -  Melanie Friedman, TETE Physical Therapist PT                  PT Recommendation and Plan  Planned Therapy Interventions (PT): balance training, bed mobility training,  gait training, home exercise program, ROM (range of motion), patient/family education, stair training, strengthening, stretching, transfer training  Plan of Care Reviewed With: patient, sibling  Progress: no change  Outcome Evaluation: Pt amb 40' with FWW and CGA. Pt navigated a step with CGA and FWW. VC for sequencing. Good ability to maintain PWB throughout mobility. Pt encouraged to utilize FWW for stairs for safest option when returning home today. Pt and brother verbalized understanding and stated they had a FWW at home. Recommend d/c home with 24/7 assist and OPPT when medically appropriate.     Time Calculation:   PT Evaluation Complexity  History, PT Evaluation Complexity: 1-2 personal factors and/or comorbidities  Examination of Body Systems (PT Eval Complexity): total of 3 or more elements  Clinical Presentation (PT Evaluation Complexity): stable  Clinical Decision Making (PT Evaluation Complexity): low complexity  Overall Complexity (PT Evaluation Complexity): low complexity     PT Charges       Row Name 12/27/23 1729             Time Calculation    Start Time 1647  -HW      PT Received On 12/27/23  -HW      PT Goal Re-Cert Due Date 01/06/24  -HW         Timed Charges    61410 - Gait Training Minutes  10  -HW         Untimed Charges    PT Eval/Re-eval Minutes 40  -HW         Total Minutes    Timed Charges Total Minutes 10  -HW      Untimed Charges Total Minutes 40  -HW       Total Minutes 50  -HW                User Key  (r) = Recorded By, (t) = Taken By, (c) = Cosigned By      Initials Name Provider Type    HW Melanie Friedman, PT Physical Therapist                  Therapy Charges for Today       Code Description Service Date Service Provider Modifiers Qty    98268628773 HC GAIT TRAINING EA 15 MIN 12/27/2023 Melanie Friedman, PT GP 1    37971704488 HC PT EVAL LOW COMPLEXITY 3 12/27/2023 Melanie Friedman, PT GP 1            PT G-Codes  Outcome Measure Options: AM-PAC 6 Clicks Basic Mobility (PT)  AM-PAC 6 Clicks  Score (PT): 20  PT Discharge Summary  Anticipated Discharge Disposition (PT): home with outpatient therapy services, home with assist    Melanie Friedman, PT  12/27/2023

## 2023-12-27 NOTE — ANESTHESIA PROCEDURE NOTES
Airway  Urgency: elective    Date/Time: 12/27/2023 12:30 PM  Airway not difficult    General Information and Staff    Patient location during procedure: OR  CRNA/CAA: Bill Alvarado CRNA    Indications and Patient Condition  Indications for airway management: airway protection    Preoxygenated: yes  MILS not maintained throughout  Mask difficulty assessment: 1 - vent by mask    Final Airway Details  Final airway type: endotracheal airway      Successful airway: ETT  Cuffed: yes   Successful intubation technique: video laryngoscopy  Facilitating devices/methods: intubating stylet  Endotracheal tube insertion site: oral  Blade: [place]  Blade size: 4  ETT size (mm): 7.5  Cormack-Lehane Classification: grade I - full view of glottis  Placement verified by: chest auscultation and capnometry   Cuff volume (mL): 8  Measured from: lips  ETT/EBT  to lips (cm): 22  Number of attempts at approach: 1  Assessment: lips, teeth, and gum same as pre-op and atraumatic intubation    Additional Comments  Negative epigastric sounds, Breath sound equal bilaterally with symmetric chest rise and fall

## (undated) DEVICE — KT INST FOR FIBERTAK W/STR/SPEAR RIGD/DRL/BIT 1.8MM DISP

## (undated) DEVICE — PK ARTHSCP KN 10

## (undated) DEVICE — T-DRAPE,EXTREMITY,STERILE: Brand: MEDLINE

## (undated) DEVICE — PAD,ARMBOARD,CONV,FOAM,2X8X20",12PR/CS: Brand: MEDLINE

## (undated) DEVICE — DRSNG PAD ABD 8X10IN STRL

## (undated) DEVICE — INTENDED FOR TISSUE SEPARATION, AND OTHER PROCEDURES THAT REQUIRE A SHARP SURGICAL BLADE TO PUNCTURE OR CUT.: Brand: BARD-PARKER ® STAINLESS STEEL BLADES

## (undated) DEVICE — GOWN,NON-REINFORCED,SIRUS,SET IN SLV,XL: Brand: MEDLINE

## (undated) DEVICE — COVER,MAYO STAND,STERILE: Brand: MEDLINE

## (undated) DEVICE — Device

## (undated) DEVICE — NEEDLE, QUINCKE, 18GX3.5": Brand: MEDLINE

## (undated) DEVICE — IMPLANTABLE DEVICE
Type: IMPLANTABLE DEVICE | Site: KNEE | Status: NON-FUNCTIONAL
Removed: 2023-12-27

## (undated) DEVICE — STRAP POSTN KN/BDY FM 5X72IN DISP

## (undated) DEVICE — KT ACL TRANSTIB W/SAWBLD

## (undated) DEVICE — C-ARM DRAPE: Brand: DEROYAL

## (undated) DEVICE — PATIENT RETURN ELECTRODE, SINGLE-USE, CONTACT QUALITY MONITORING, ADULT, WITH 9FT CORD, FOR PATIENTS WEIGING OVER 33LBS. (15KG): Brand: MEGADYNE

## (undated) DEVICE — PK EXTREM LOWR 10

## (undated) DEVICE — GLV SURG SENSICARE PI MIC PF SZ7.5 LF STRL

## (undated) DEVICE — TBG PENCL TELESCP MEGADYNE SMOKE EVAC 10FT

## (undated) DEVICE — BRACE KN XACT/ROM TELESCP ADJ UNIV

## (undated) DEVICE — SEALANT WND FIBRIN TISSEEL PREFIL/SYR/PRIMAFZ 4ML

## (undated) DEVICE — KT PIN ORTHO TIB TBROSITY T3 AMZ SYS

## (undated) DEVICE — SST TWIST DRILL, STANDARD, 2MM DIA. X 127MM: Brand: MICROAIRE®

## (undated) DEVICE — SPNG LAP PREWSH SFTPK 18X18IN STRL PK/5

## (undated) DEVICE — ANTIBACTERIAL UNDYED BRAIDED (POLYGLACTIN 910), SYNTHETIC ABSORBABLE SUTURE: Brand: COATED VICRYL

## (undated) DEVICE — BNDG ELAS CO-FLEX SLF ADHR 6IN 5YD LF STRL

## (undated) DEVICE — TB CASSET ARTHSCP CROSSFLOW INFLOW LF

## (undated) DEVICE — C-ARM: Brand: UNBRANDED

## (undated) DEVICE — KT PUMP INFUBLOCK MDL 2100 PMKITSOLIS

## (undated) DEVICE — COL TISS GRAFTNET AUTOLOGOUS

## (undated) DEVICE — BNDG,ELSTC,MATRIX,STRL,6"X5YD,LF,HOOK&LP: Brand: MEDLINE

## (undated) DEVICE — DRP C/ARMOR

## (undated) DEVICE — UNDERCAST PADDING: Brand: DEROYAL

## (undated) DEVICE — NDL REV W/NITNL LP C13 .5CIR 36.6MM

## (undated) DEVICE — SUT ETHLN 3/0 FS1 30IN 669H

## (undated) DEVICE — TUBING, SUCTION, 1/4" X 10', STRAIGHT: Brand: MEDLINE

## (undated) DEVICE — DRAPE,TOP,102X53,STERILE: Brand: MEDLINE

## (undated) DEVICE — BLD CUT FORMLA AGGR ANGL 4MM

## (undated) DEVICE — SUT MNCRYL PLS ANTIB UD 3/0 PS2 27IN

## (undated) DEVICE — TRAP FLD MINIVAC MEGADYNE 100ML

## (undated) DEVICE — GLV SURG SENSICARE PI MIC PF SZ8 LF STRL

## (undated) DEVICE — TAPE,ELASTIC,FOAM,CURAD,4"X5.5YD,LF: Brand: CURAD

## (undated) DEVICE — INTENDED FOR TISSUE SEPARATION, AND OTHER PROCEDURES THAT REQUIRE A SHARP SURGICAL BLADE TO PUNCTURE OR CUT.: Brand: BARD-PARKER ® SAFETYLOCK CARBON RIB-BACK BLADES

## (undated) DEVICE — PRECISION (12.0 X 0.51 X 34.5MM)

## (undated) DEVICE — STRYKER PERFORMANCE SERIES SAGITTAL BLADE: Brand: STRYKER PERFORMANCE SERIES

## (undated) DEVICE — BLANKT WARM UPPR/BDY ARM/OUT 57X196CM

## (undated) DEVICE — NDL FLTR BLNT 18G 1 1/2IN